# Patient Record
Sex: FEMALE | Race: WHITE | NOT HISPANIC OR LATINO | URBAN - METROPOLITAN AREA
[De-identification: names, ages, dates, MRNs, and addresses within clinical notes are randomized per-mention and may not be internally consistent; named-entity substitution may affect disease eponyms.]

---

## 2022-12-19 ENCOUNTER — TRANSCRIBE ORDERS (OUTPATIENT)
Dept: LAB | Facility: HOSPITAL | Age: 27
End: 2022-12-19

## 2022-12-19 ENCOUNTER — LAB (OUTPATIENT)
Dept: LAB | Facility: HOSPITAL | Age: 27
End: 2022-12-19

## 2022-12-19 DIAGNOSIS — Z01.419 ROUTINE GYNECOLOGICAL EXAMINATION: Primary | ICD-10-CM

## 2022-12-19 DIAGNOSIS — Z01.419 ROUTINE GYNECOLOGICAL EXAMINATION: ICD-10-CM

## 2022-12-19 LAB
25(OH)D3 SERPL-MCNC: 46.9 NG/ML (ref 30–100)
ALBUMIN SERPL-MCNC: 4.9 G/DL (ref 3.5–5.2)
ALBUMIN/GLOB SERPL: 1.8 G/DL
ALP SERPL-CCNC: 52 U/L (ref 39–117)
ALT SERPL W P-5'-P-CCNC: 15 U/L (ref 1–33)
ANION GAP SERPL CALCULATED.3IONS-SCNC: 9.9 MMOL/L (ref 5–15)
AST SERPL-CCNC: 20 U/L (ref 1–32)
BILIRUB SERPL-MCNC: 0.9 MG/DL (ref 0–1.2)
BUN SERPL-MCNC: 8 MG/DL (ref 6–20)
BUN/CREAT SERPL: 11 (ref 7–25)
CALCIUM SPEC-SCNC: 10.1 MG/DL (ref 8.6–10.5)
CHLORIDE SERPL-SCNC: 106 MMOL/L (ref 98–107)
CO2 SERPL-SCNC: 24.1 MMOL/L (ref 22–29)
CREAT SERPL-MCNC: 0.73 MG/DL (ref 0.57–1)
DEPRECATED RDW RBC AUTO: 43.3 FL (ref 37–54)
EGFRCR SERPLBLD CKD-EPI 2021: 114.3 ML/MIN/1.73
ERYTHROCYTE [DISTWIDTH] IN BLOOD BY AUTOMATED COUNT: 12.4 % (ref 12.3–15.4)
GLOBULIN UR ELPH-MCNC: 2.8 GM/DL
GLUCOSE SERPL-MCNC: 81 MG/DL (ref 65–99)
HBA1C MFR BLD: 4.7 % (ref 4.8–5.6)
HCT VFR BLD AUTO: 40.8 % (ref 34–46.6)
HGB BLD-MCNC: 13.8 G/DL (ref 12–15.9)
MCH RBC QN AUTO: 32.1 PG (ref 26.6–33)
MCHC RBC AUTO-ENTMCNC: 33.8 G/DL (ref 31.5–35.7)
MCV RBC AUTO: 94.9 FL (ref 79–97)
PLATELET # BLD AUTO: 170 10*3/MM3 (ref 140–450)
PMV BLD AUTO: 12.1 FL (ref 6–12)
POTASSIUM SERPL-SCNC: 4.2 MMOL/L (ref 3.5–5.2)
PROT SERPL-MCNC: 7.7 G/DL (ref 6–8.5)
RBC # BLD AUTO: 4.3 10*6/MM3 (ref 3.77–5.28)
SODIUM SERPL-SCNC: 140 MMOL/L (ref 136–145)
TSH SERPL DL<=0.05 MIU/L-ACNC: 0.62 UIU/ML (ref 0.27–4.2)
VIT B12 BLD-MCNC: 375 PG/ML (ref 211–946)
WBC NRBC COR # BLD: 5.75 10*3/MM3 (ref 3.4–10.8)

## 2022-12-19 PROCEDURE — 82607 VITAMIN B-12: CPT

## 2022-12-19 PROCEDURE — 36415 COLL VENOUS BLD VENIPUNCTURE: CPT

## 2022-12-19 PROCEDURE — 80050 GENERAL HEALTH PANEL: CPT

## 2022-12-19 PROCEDURE — 82306 VITAMIN D 25 HYDROXY: CPT

## 2022-12-19 PROCEDURE — 83036 HEMOGLOBIN GLYCOSYLATED A1C: CPT

## 2023-06-01 ENCOUNTER — LAB (OUTPATIENT)
Dept: LAB | Facility: HOSPITAL | Age: 28
End: 2023-06-01
Payer: COMMERCIAL

## 2023-06-01 ENCOUNTER — TRANSCRIBE ORDERS (OUTPATIENT)
Dept: LAB | Facility: HOSPITAL | Age: 28
End: 2023-06-01
Payer: COMMERCIAL

## 2023-06-01 DIAGNOSIS — Z30.09 GENERAL COUNSELING FOR PRESCRIPTION OF ORAL CONTRACEPTIVES: ICD-10-CM

## 2023-06-01 DIAGNOSIS — Z30.09 GENERAL COUNSELING FOR PRESCRIPTION OF ORAL CONTRACEPTIVES: Primary | ICD-10-CM

## 2023-06-01 LAB
ESTRADIOL SERPL HS-MCNC: 60.8 PG/ML
FSH SERPL-ACNC: 8.8 MIU/ML
LH SERPL-ACNC: 11.4 MIU/ML
PROGEST SERPL-MCNC: 1.24 NG/ML
PROLACTIN SERPL-MCNC: 13.5 NG/ML (ref 4.79–23.3)

## 2023-06-01 PROCEDURE — 82670 ASSAY OF TOTAL ESTRADIOL: CPT | Performed by: OBSTETRICS & GYNECOLOGY

## 2023-06-01 PROCEDURE — 82397 CHEMILUMINESCENT ASSAY: CPT

## 2023-06-01 PROCEDURE — 84144 ASSAY OF PROGESTERONE: CPT | Performed by: OBSTETRICS & GYNECOLOGY

## 2023-06-01 PROCEDURE — 83002 ASSAY OF GONADOTROPIN (LH): CPT

## 2023-06-01 PROCEDURE — 36415 COLL VENOUS BLD VENIPUNCTURE: CPT

## 2023-06-01 PROCEDURE — 84146 ASSAY OF PROLACTIN: CPT

## 2023-06-01 PROCEDURE — 83036 HEMOGLOBIN GLYCOSYLATED A1C: CPT | Performed by: OBSTETRICS & GYNECOLOGY

## 2023-06-01 PROCEDURE — 83001 ASSAY OF GONADOTROPIN (FSH): CPT

## 2023-06-02 LAB — HBA1C MFR BLD: 4.9 % (ref 4.8–5.6)

## 2023-06-04 LAB — MIS SERPL-MCNC: 2.84 NG/ML

## 2024-04-02 ENCOUNTER — TRANSCRIBE ORDERS (OUTPATIENT)
Dept: LAB | Facility: HOSPITAL | Age: 29
End: 2024-04-02
Payer: COMMERCIAL

## 2024-04-02 ENCOUNTER — LAB (OUTPATIENT)
Dept: LAB | Facility: HOSPITAL | Age: 29
End: 2024-04-02
Payer: COMMERCIAL

## 2024-04-02 DIAGNOSIS — Z32.02 PREGNANCY EXAMINATION OR TEST, NEGATIVE RESULT: Primary | ICD-10-CM

## 2024-04-02 DIAGNOSIS — Z32.02 PREGNANCY EXAMINATION OR TEST, NEGATIVE RESULT: ICD-10-CM

## 2024-04-02 LAB — HCG INTACT+B SERPL-ACNC: <1 MIU/ML

## 2024-04-02 PROCEDURE — 84702 CHORIONIC GONADOTROPIN TEST: CPT

## 2024-04-02 PROCEDURE — 36415 COLL VENOUS BLD VENIPUNCTURE: CPT

## 2024-06-24 PROCEDURE — 88305 TISSUE EXAM BY PATHOLOGIST: CPT | Performed by: OBSTETRICS & GYNECOLOGY

## 2024-06-24 PROCEDURE — 86900 BLOOD TYPING SEROLOGIC ABO: CPT | Performed by: OBSTETRICS & GYNECOLOGY

## 2024-06-24 PROCEDURE — 86901 BLOOD TYPING SEROLOGIC RH(D): CPT | Performed by: OBSTETRICS & GYNECOLOGY

## 2024-06-25 ENCOUNTER — LAB REQUISITION (OUTPATIENT)
Dept: LAB | Facility: HOSPITAL | Age: 29
End: 2024-06-25
Payer: COMMERCIAL

## 2024-06-25 DIAGNOSIS — O02.1 MISSED ABORTION: ICD-10-CM

## 2024-06-26 LAB
CYTO UR: NORMAL
LAB AP CASE REPORT: NORMAL
LAB AP CLINICAL INFORMATION: NORMAL
PATH REPORT.FINAL DX SPEC: NORMAL
PATH REPORT.GROSS SPEC: NORMAL

## 2024-11-19 NOTE — PROGRESS NOTES
[facility]  Genetic Counseling Note    This appointment was conducted over the phone. Essence Aguilar confirmed her full name, date of birth, and that she was physically located in the [facility].    Patient Name:  Essence Aguilar  :   1995  DELMA:   2024  MRN:   [id number]  Patient's Age at CLOTILDE: 30 years    Referring Provider: Leni Gill MD                                        Referring Diagnosis:  Essence Aguilar is a 29 y.o. female. Essence is here in consultation for abnormal genetic results in a previous pregnancy.     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 Aguilar reports no use of fertility treatments, gamete donors, or assistive reproductive technologies to conceive this pregnancy.     It took the couple 2.5 years to conceive their first pregnancy.     Patient medical history:   No past medical history on file.    Ms. Aguilar reports no history of diabetes or infertility.     She had an appendectomy, small intestine resection, and right oophrectomy in  at 13 days old due to a cyst the size of an orange. No cysts noted since then. Her family was told she had colitis as an infant, and her diet was restricted. She has had bowel problems in childhood and adulthood, and has had colonoscopies. No history polyps. PT has advised her that she has a high pelvic floor.    Medications:   No current outpatient medications on file.     No current facility-administered medications for this visit.     Genetic/lab testing already completed during current pregnancy:  Cell free DNA screening: Leeann drawn last Fri, 24  Carrier Screening: no    Psychosocial History:   Psychosocial assessment: Essence wanted more information about what her Anora results  meant, and the risks for her current pregnancy.                                                                              Family History:    A three-generation family history was obtained for the patient and the father of the baby. See attached pedigree.    Of significance, brother with esophagus concerns and cardiac arrhythmia. Paternal grandfather with congestive heart failure.    The father of the baby, Narayan (first name Hernando), is a 43 year old male with a history of hypertension, a heart concern, high weight, and low sperm motility. He has two sons from a previous relationship; his former wife passed away at age 33 due to breast cancer (reportedly negative BRCA testing). Son age 14 has type 1 diabetes. Son age 10 has autism; he has had genetic testing through the [employer] study but results have not yet been returned. Narayan's brother has type 1 diabetes. Sister had gastric bypass. Mother with congestive heart failure.     We do not have medical records regarding any of these diagnoses.     Information Discussed:   Abnormal Genetic Testing in Previous Pregnancy   Essence had a miscarriage this summer, and microarray testing called [facility] was ordered to see if there was a chromosome difference. Chromosomes are large pieces of DNA. Typically, people have 23 pairs of chromosomes, and 46 total chromosomes. 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 This means that, when Essence's egg cell was forming, it had an extra copy of chromosome 10. Trisomy 10 is not compatible with development beyond early pregnancy.    Approximately 15-20% of all recognized pregnancies will end in miscarriage. Causes of recurrent miscarriage include physical abnormalities, endocrine factors, environmental factors, immunologic factors, maternal factors, chromosome abnormalities, and single gene disorders. Approximately 70% of first trimester miscarriages are due to a sporadic chromosome abnormality. In this  case, it is most likely that trisomy 10 was sporadic and will not happen again. However, given the abnormal chromosome history affecting pregnancy and the couple's difficulty conceiving, we discussed that approximately 1 in 500 individuals carry a balanced translocation. This is when a section from one chromosome of a particular pair changes places with a section from a chromosome of another pair. When the two breakpoints do not interrupt a gene and there is no gain or loss of material from either chromosome it is called a balanced translocation. Someone with a balanced translocation (a carrier) typically has no health or developmental problems associated with the translocation, although they may have difficulties with miscarriage or fertility, and may have a higher risk of a child born with an unbalanced chromosome complement. Approximately 3-5% of recurrent miscarriages are caused by a chromosome abnormality resulting from a translocation inherited from a parent.      Essence Aguilar consented to chromosome analysis to evaluate for the possibility she is a balanced translocation carrier. Chromosome analysis #8600 was ordered at [facility]. The blood draw will be at [facility] on the 1st floor of [facility], [address]. You do not need an appointment for this lab, but it's helpful for me to know what day you plan to go. The current plan is Friday, 12/20/24. If the date changes, please let me know.     Follow-up Plan:    The patient consented to genetic testing.   - Genetic testing was ordered: [facility] lab, Chromosome Analysis #8600 test.   - Blood draw will be at the [facility] on the first floor of [facility], [address]  - Results are expected 3 weeks after the lab receives the sample. We will call with results.     Please feel free to contact me with additional questions at [phone].    I personally spent 25 minutes in  face-to-face time with this patient. I provided genetic counseling services, including obtaining a structured family history, analysis of genetic and other risks, counseling of the patient regarding abnormal genetic testing in pregnancy, review of medical information, review of previous test results and discussion of genetic testing options.    Lilliam Pablo MS, [facility] [place]  Genetic Counselor  [facility] Microarray Result

## 2024-12-13 ENCOUNTER — TRANSCRIBE ORDERS (OUTPATIENT)
Facility: HOSPITAL | Age: 29
End: 2024-12-13
Payer: COMMERCIAL

## 2024-12-13 ENCOUNTER — LAB (OUTPATIENT)
Facility: HOSPITAL | Age: 29
End: 2024-12-13
Payer: COMMERCIAL

## 2024-12-13 DIAGNOSIS — Z34.02 ENCOUNTER FOR SUPERVISION OF NORMAL FIRST PREGNANCY IN SECOND TRIMESTER: ICD-10-CM

## 2024-12-13 DIAGNOSIS — Z34.91 FIRST TRIMESTER PREGNANCY: ICD-10-CM

## 2024-12-13 DIAGNOSIS — Z34.02 ENCOUNTER FOR SUPERVISION OF NORMAL FIRST PREGNANCY IN SECOND TRIMESTER: Primary | ICD-10-CM

## 2024-12-13 LAB
ABO GROUP BLD: NORMAL
AMPHET+METHAMPHET UR QL: NEGATIVE
AMPHETAMINES UR QL: NEGATIVE
BACTERIA UR QL AUTO: ABNORMAL /HPF
BARBITURATES UR QL SCN: NEGATIVE
BENZODIAZ UR QL SCN: NEGATIVE
BILIRUB UR QL STRIP: NEGATIVE
BILIRUB UR QL STRIP: NEGATIVE
BLD GP AB SCN SERPL QL: NEGATIVE
BUPRENORPHINE SERPL-MCNC: NEGATIVE NG/ML
CANNABINOIDS SERPL QL: NEGATIVE
CLARITY UR: CLEAR
CLARITY UR: CLEAR
COCAINE UR QL: NEGATIVE
COLOR UR: YELLOW
COLOR UR: YELLOW
FENTANYL UR-MCNC: NEGATIVE NG/ML
GLUCOSE UR STRIP-MCNC: NEGATIVE MG/DL
GLUCOSE UR STRIP-MCNC: NEGATIVE MG/DL
HGB UR QL STRIP.AUTO: NEGATIVE
HGB UR QL STRIP.AUTO: NEGATIVE
HOLD SPECIMEN: NORMAL
HYALINE CASTS UR QL AUTO: ABNORMAL /LPF
KETONES UR QL STRIP: NEGATIVE
KETONES UR QL STRIP: NEGATIVE
LEUKOCYTE ESTERASE UR QL STRIP.AUTO: NEGATIVE
LEUKOCYTE ESTERASE UR QL STRIP.AUTO: NEGATIVE
METHADONE UR QL SCN: NEGATIVE
NITRITE UR QL STRIP: NEGATIVE
NITRITE UR QL STRIP: NEGATIVE
OPIATES UR QL: NEGATIVE
OXYCODONE UR QL SCN: NEGATIVE
PCP UR QL SCN: NEGATIVE
PH UR STRIP.AUTO: 6 [PH] (ref 5–8)
PH UR STRIP.AUTO: 6 [PH] (ref 5–8)
PROT UR QL STRIP: NEGATIVE
PROT UR QL STRIP: NEGATIVE
RBC # UR STRIP: ABNORMAL /HPF
REF LAB TEST METHOD: ABNORMAL
RH BLD: NEGATIVE
SP GR UR STRIP: 1.02 (ref 1–1.03)
SP GR UR STRIP: 1.02 (ref 1–1.03)
SQUAMOUS #/AREA URNS HPF: ABNORMAL /HPF
TRICYCLICS UR QL SCN: NEGATIVE
UROBILINOGEN UR QL STRIP: NORMAL
UROBILINOGEN UR QL STRIP: NORMAL
WBC # UR STRIP: ABNORMAL /HPF

## 2024-12-13 PROCEDURE — 86901 BLOOD TYPING SEROLOGIC RH(D): CPT

## 2024-12-13 PROCEDURE — 87340 HEPATITIS B SURFACE AG IA: CPT

## 2024-12-13 PROCEDURE — 86850 RBC ANTIBODY SCREEN: CPT

## 2024-12-13 PROCEDURE — G0432 EIA HIV-1/HIV-2 SCREEN: HCPCS

## 2024-12-13 PROCEDURE — 36415 COLL VENOUS BLD VENIPUNCTURE: CPT

## 2024-12-13 PROCEDURE — 80307 DRUG TEST PRSMV CHEM ANLYZR: CPT

## 2024-12-13 PROCEDURE — 86780 TREPONEMA PALLIDUM: CPT

## 2024-12-13 PROCEDURE — 86900 BLOOD TYPING SEROLOGIC ABO: CPT

## 2024-12-13 PROCEDURE — 86803 HEPATITIS C AB TEST: CPT

## 2024-12-13 PROCEDURE — 87086 URINE CULTURE/COLONY COUNT: CPT

## 2024-12-13 PROCEDURE — 87591 N.GONORRHOEAE DNA AMP PROB: CPT

## 2024-12-13 PROCEDURE — 86762 RUBELLA ANTIBODY: CPT

## 2024-12-13 PROCEDURE — 87491 CHLMYD TRACH DNA AMP PROBE: CPT

## 2024-12-13 PROCEDURE — 81001 URINALYSIS AUTO W/SCOPE: CPT

## 2024-12-14 LAB
BACTERIA SPEC AEROBE CULT: NO GROWTH
HBV SURFACE AG SERPL QL IA: NORMAL
HCV AB SER QL: NORMAL
HIV 1+2 AB+HIV1 P24 AG SERPL QL IA: NORMAL
TREPONEMA PALLIDUM IGG+IGM AB [PRESENCE] IN SERUM OR PLASMA BY IMMUNOASSAY: NORMAL

## 2024-12-16 LAB — RUBV IGG SERPL IA-ACNC: 0.95 INDEX

## 2024-12-17 LAB
C TRACH RRNA SPEC QL NAA+PROBE: NEGATIVE
N GONORRHOEA RRNA SPEC QL NAA+PROBE: NEGATIVE

## 2024-12-19 ENCOUNTER — CLINICAL SUPPORT (OUTPATIENT)
Dept: GENETICS | Facility: HOSPITAL | Age: 29
End: 2024-12-19
Payer: COMMERCIAL

## 2024-12-19 DIAGNOSIS — Z31.5 ENCOUNTER FOR PROCREATIVE GENETIC COUNSELING AND TESTING: ICD-10-CM

## 2024-12-19 DIAGNOSIS — O28.5 ABNORMAL GENETIC TEST DURING PREGNANCY: Primary | ICD-10-CM

## 2024-12-19 DIAGNOSIS — Z34.81 ENCOUNTER FOR SUPERVISION OF OTHER NORMAL PREGNANCY IN FIRST TRIMESTER: ICD-10-CM

## 2025-01-14 ENCOUNTER — TELEPHONE (OUTPATIENT)
Dept: GENETICS | Facility: HOSPITAL | Age: 30
End: 2025-01-14
Payer: COMMERCIAL

## 2025-01-14 NOTE — TELEPHONE ENCOUNTER
Called pt to reschedule a missed blood draw for genetic testing in regards to her genetic counseling appt on 12-19-24. Left message asking pt to call me back to reschedule.